# Patient Record
Sex: FEMALE | Race: OTHER | HISPANIC OR LATINO | URBAN - METROPOLITAN AREA
[De-identification: names, ages, dates, MRNs, and addresses within clinical notes are randomized per-mention and may not be internally consistent; named-entity substitution may affect disease eponyms.]

---

## 2023-10-11 ENCOUNTER — EMERGENCY (EMERGENCY)
Facility: HOSPITAL | Age: 68
LOS: 1 days | Discharge: HOME | End: 2023-10-11
Attending: EMERGENCY MEDICINE
Payer: SELF-PAY

## 2023-10-11 VITALS
SYSTOLIC BLOOD PRESSURE: 145 MMHG | TEMPERATURE: 98 F | HEART RATE: 72 BPM | HEIGHT: 60 IN | OXYGEN SATURATION: 98 % | WEIGHT: 143.3 LBS | RESPIRATION RATE: 20 BRPM | DIASTOLIC BLOOD PRESSURE: 82 MMHG

## 2023-10-11 PROCEDURE — 25605 CLTX DST RDL FX/EPHYS SEP W/: CPT | Mod: 54,LT

## 2023-10-11 PROCEDURE — 73110 X-RAY EXAM OF WRIST: CPT | Mod: 26,LT,76

## 2023-10-11 PROCEDURE — 99284 EMERGENCY DEPT VISIT MOD MDM: CPT | Mod: 57

## 2023-10-11 PROCEDURE — 99285 EMERGENCY DEPT VISIT HI MDM: CPT

## 2023-10-11 PROCEDURE — 25605 CLTX DST RDL FX/EPHYS SEP W/: CPT | Mod: LT

## 2023-10-11 PROCEDURE — 73110 X-RAY EXAM OF WRIST: CPT

## 2023-10-11 NOTE — ED ADULT TRIAGE NOTE - SOURCE OF INFORMATION
New Rx sent per PCP preferences below.  Patient updated via portal.  Will leave encounter open at this time in case of further questions or concerns from patient or pharmacy.    Patient

## 2023-10-11 NOTE — ED PROVIDER NOTE - CARE PROVIDER_API CALL
Raghu Forde  Orthopaedic Surgery  [address]  [place] [address]  Phone: [phone]  Fax: [phone]  Follow Up Time:

## 2023-10-11 NOTE — ED PROVIDER NOTE - PATIENT PORTAL LINK FT
You can access the FollowMyHealth Patient Portal offered by [facility] by registering at the following website: [email or web address]. By joining [facility]’s FollowMyHealth portal, you will also be able to view your health information using other applications (apps) compatible with our system.

## 2023-10-11 NOTE — ED PROVIDER NOTE - OBJECTIVE STATEMENT
67F presenting to the ED c/o left wrist pain x 2 days s/p fall yesterday. Pt denies numbness/tingling and has no other complaints at this time.

## 2023-10-11 NOTE — ED PROVIDER NOTE - ATTENDING APP SHARED VISIT CONTRIBUTION OF CARE
I, Selvin Lambert, performed a face to face bedside interview with this patient regarding history of present illness, review of symptoms and relevant past medical, social and family history.  I completed an independent physical examination. I have communicated the patient’s plan of care and disposition with the ACP.  67 year old female presents s/p FOOSH with L wrist pain, no head trauma  Gen: NAD, well appearing  CV: RRR  Pul: CTA b/l  Abd: Soft, non-distended, non-tender  Neuro: no focal deficits  msk: no midline spinal ttp, ttp of the L wrist, neurovasc intact  Pt improved, splinted, stable for dc and ortho f/u

## 2023-10-11 NOTE — ED ADULT TRIAGE NOTE - CHIEF COMPLAINT QUOTE
As  per daughter " she slipped and fell yesterday landing on her L wrist " C/o pain and swelling . Limited ROM due o pain

## 2023-10-11 NOTE — ED ADULT NURSE NOTE - OBJECTIVE STATEMENT
Aox4. Pt presenting to Emergency Department complaining of L wrist pain and swelling. Pt reports falling yesterday in which she fell on hands. Denies LOC, syncope, chest pain, SOB, abd pain, back pain, headaches, dizziness, lightheadedness, fevers, chills, nausea, vomiting, diarrhea, constipation and dysuria.

## 2023-10-11 NOTE — ED PROVIDER NOTE - CLINICAL SUMMARY MEDICAL DECISION MAKING FREE TEXT BOX
67F presenting to the ED c/o left wrist pain x 2 days s/p fall yesterday. Xray reviewed- distal radius fx. Reduced/splinted, pt stable for d/c with ortho f/u.